# Patient Record
Sex: MALE
[De-identification: names, ages, dates, MRNs, and addresses within clinical notes are randomized per-mention and may not be internally consistent; named-entity substitution may affect disease eponyms.]

---

## 2020-10-27 PROBLEM — Z00.00 ENCOUNTER FOR PREVENTIVE HEALTH EXAMINATION: Status: ACTIVE | Noted: 2020-10-27

## 2020-10-28 ENCOUNTER — APPOINTMENT (OUTPATIENT)
Dept: UROLOGY | Facility: CLINIC | Age: 70
End: 2020-10-28
Payer: MEDICARE

## 2020-10-28 VITALS
TEMPERATURE: 97.2 F | SYSTOLIC BLOOD PRESSURE: 148 MMHG | HEART RATE: 82 BPM | HEIGHT: 70 IN | DIASTOLIC BLOOD PRESSURE: 83 MMHG | WEIGHT: 227 LBS | BODY MASS INDEX: 32.5 KG/M2

## 2020-10-28 DIAGNOSIS — Z86.59 PERSONAL HISTORY OF OTHER MENTAL AND BEHAVIORAL DISORDERS: ICD-10-CM

## 2020-10-28 DIAGNOSIS — F17.200 NICOTINE DEPENDENCE, UNSPECIFIED, UNCOMPLICATED: ICD-10-CM

## 2020-10-28 DIAGNOSIS — Z72.89 OTHER PROBLEMS RELATED TO LIFESTYLE: ICD-10-CM

## 2020-10-28 DIAGNOSIS — N40.0 BENIGN PROSTATIC HYPERPLASIA WITHOUT LOWER URINARY TRACT SYMPMS: ICD-10-CM

## 2020-10-28 DIAGNOSIS — Z87.09 PERSONAL HISTORY OF OTHER DISEASES OF THE RESPIRATORY SYSTEM: ICD-10-CM

## 2020-10-28 DIAGNOSIS — Z86.79 PERSONAL HISTORY OF OTHER DISEASES OF THE CIRCULATORY SYSTEM: ICD-10-CM

## 2020-10-28 DIAGNOSIS — R97.20 ELEVATED PROSTATE, SPECIFIC ANTIGEN [PSA]: ICD-10-CM

## 2020-10-28 PROCEDURE — 99072 ADDL SUPL MATRL&STAF TM PHE: CPT

## 2020-10-28 PROCEDURE — 99204 OFFICE O/P NEW MOD 45 MIN: CPT

## 2020-10-28 RX ORDER — SILDENAFIL CITRATE 100 MG/1
100 TABLET, FILM COATED ORAL
Refills: 0 | Status: ACTIVE | COMMUNITY

## 2020-10-28 RX ORDER — DOCUSATE SODIUM 100 MG/1
CAPSULE ORAL
Refills: 0 | Status: ACTIVE | COMMUNITY

## 2020-10-28 RX ORDER — AMLODIPINE BESYLATE 5 MG/1
5 TABLET ORAL
Refills: 0 | Status: ACTIVE | COMMUNITY

## 2020-10-28 RX ORDER — TAMSULOSIN HYDROCHLORIDE 0.4 MG/1
0.4 CAPSULE ORAL
Refills: 0 | Status: ACTIVE | COMMUNITY

## 2020-10-28 RX ORDER — CHLORHEXIDINE GLUCONATE 22ML
SPONGE MISCELLANEOUS
Refills: 0 | Status: ACTIVE | COMMUNITY

## 2020-10-28 RX ORDER — SACCHAROMYCES BOULARDII 50 MG
CAPSULE ORAL
Refills: 0 | Status: ACTIVE | COMMUNITY

## 2020-10-28 RX ORDER — PAROXETINE HYDROCHLORIDE 10 MG/1
10 TABLET, FILM COATED ORAL
Refills: 0 | Status: ACTIVE | COMMUNITY

## 2020-10-28 RX ORDER — TELMISARTAN 40 MG/1
40 TABLET ORAL
Refills: 0 | Status: ACTIVE | COMMUNITY

## 2020-10-28 RX ORDER — OLMESARTAN MEDOXOMIL 40 MG/1
40 TABLET, FILM COATED ORAL
Refills: 0 | Status: ACTIVE | COMMUNITY

## 2020-10-28 NOTE — HISTORY OF PRESENT ILLNESS
[FreeTextEntry1] : 70M hx of DM, BPH, Elevated PSA presents to establish care. He used to follow Dr. Daniel (urologist) who recently retired. \par His PSA 6/2020 was 9.1; on 10/2019 was 8.2 and in 2018 was 6.8. Pt reports he was told by Dr. Daniel no to be concerned about elevated PSA due to his age and hx of smoking. Currently smokes 1PPD\par Pt reports slow stream, nocturia 1-2x, straining in morning, frequency, denies dysuria or hematuria

## 2020-10-28 NOTE — REVIEW OF SYSTEMS
[Eyesight Problems] : eyesight problems [Constipation] : constipation [Diarrhea] : diarrhea [Poor quality erections] : Poor quality erections [Seen by urologist before (Name)  ___] : Preciously seen by a urologist: [unfilled] [History of kidney stones] : history of kidney stones [Wake up at night to urinate  How many times?  ___] : wakes up to urinate [unfilled] times during the night [Joint Pain] : joint pain [Joint Swelling] : joint swelling [Muscle Weakness] : muscle weakness [Negative] : Endocrine

## 2020-10-28 NOTE — PHYSICAL EXAM
[General Appearance - Well Developed] : well developed [General Appearance - Well Nourished] : well nourished [Normal Appearance] : normal appearance [Well Groomed] : well groomed [General Appearance - In No Acute Distress] : no acute distress [Edema] : no peripheral edema [] : no respiratory distress [Respiration, Rhythm And Depth] : normal respiratory rhythm and effort [Exaggerated Use Of Accessory Muscles For Inspiration] : no accessory muscle use [Abdomen Soft] : soft [Abdomen Tenderness] : non-tender [Urethral Meatus] : meatus normal [Urinary Bladder Findings] : the bladder was normal on palpation [Testes Tenderness] : no tenderness of the testes [Testes Mass (___cm)] : there were no testicular masses [Prostate Tenderness] : the prostate was not tender [No Prostate Nodules] : no prostate nodules [Prostate Size ___ gm] : prostate size [unfilled] gm [Normal Station and Gait] : the gait and station were normal for the patient's age [FreeTextEntry1] : left hydrocele

## 2020-10-28 NOTE — ASSESSMENT
[FreeTextEntry1] : 70M with elevated PSA 8.4\par will repeat PSA and % free PSA and call with results

## 2021-11-15 ENCOUNTER — APPOINTMENT (OUTPATIENT)
Dept: COLORECTAL SURGERY | Facility: CLINIC | Age: 71
End: 2021-11-15

## 2023-11-27 ENCOUNTER — APPOINTMENT (OUTPATIENT)
Dept: OPHTHALMOLOGY | Facility: CLINIC | Age: 73
End: 2023-11-27

## 2023-12-26 ENCOUNTER — OFFICE (OUTPATIENT)
Dept: URBAN - METROPOLITAN AREA CLINIC 103 | Facility: CLINIC | Age: 73
Setting detail: OPHTHALMOLOGY
End: 2023-12-26
Payer: MEDICARE

## 2023-12-26 DIAGNOSIS — H25.043: ICD-10-CM

## 2023-12-26 DIAGNOSIS — H25.013: ICD-10-CM

## 2023-12-26 DIAGNOSIS — E11.9: ICD-10-CM

## 2023-12-26 DIAGNOSIS — H43.811: ICD-10-CM

## 2023-12-26 DIAGNOSIS — H25.13: ICD-10-CM

## 2023-12-26 PROBLEM — H43.813 POSTERIOR VITREOUS DETACHMENT; BOTH EYES: Status: ACTIVE | Noted: 2023-12-26

## 2023-12-26 PROCEDURE — 92250 FUNDUS PHOTOGRAPHY W/I&R: CPT | Performed by: OPHTHALMOLOGY

## 2023-12-26 PROCEDURE — 92004 COMPRE OPH EXAM NEW PT 1/>: CPT | Performed by: OPHTHALMOLOGY

## 2023-12-26 ASSESSMENT — REFRACTION_CURRENTRX
OD_OVR_VA: 20/
OS_OVR_VA: 20/
OS_SPHERE: -2.25
OS_VPRISM_DIRECTION: PROGS
OS_ADD: +2.50
OD_AXIS: 035
OD_AXIS: 045
OD_VPRISM_DIRECTION: PROGS
OD_CYLINDER: -0.50
OS_OVR_VA: 20/
OS_SPHERE: -2.25
OS_CYLINDER: -0.50
OD_SPHERE: -1.50
OD_ADD: +2.50
OS_AXIS: 083
OD_OVR_VA: 20/
OS_CYLINDER: -0.50
OS_AXIS: 085
OD_SPHERE: -1.50
OD_CYLINDER: -0.50

## 2023-12-26 ASSESSMENT — REFRACTION_AUTOREFRACTION
OD_AXIS: 060
OS_AXIS: 166
OS_SPHERE: -3.75
OS_CYLINDER: -0.50
OD_SPHERE: -2.00
OD_CYLINDER: -1.00

## 2023-12-26 ASSESSMENT — CONFRONTATIONAL VISUAL FIELD TEST (CVF)
OD_FINDINGS: FULL
OS_FINDINGS: FULL

## 2023-12-26 ASSESSMENT — SPHEQUIV_DERIVED
OD_SPHEQUIV: -2.375
OS_SPHEQUIV: -4
OD_SPHEQUIV: -2.5

## 2023-12-26 ASSESSMENT — REFRACTION_MANIFEST
OD_SPHERE: -2.00
OS_CYLINDER: SPHERE
OS_SPHERE: -4.00
OD_CYLINDER: -0.75
OD_AXIS: 060

## 2024-01-30 ENCOUNTER — OFFICE (OUTPATIENT)
Dept: URBAN - METROPOLITAN AREA CLINIC 103 | Facility: CLINIC | Age: 74
Setting detail: OPHTHALMOLOGY
End: 2024-01-30
Payer: MEDICARE

## 2024-01-30 DIAGNOSIS — H25.043: ICD-10-CM

## 2024-01-30 DIAGNOSIS — H25.013: ICD-10-CM

## 2024-01-30 DIAGNOSIS — H25.11: ICD-10-CM

## 2024-01-30 DIAGNOSIS — E11.3293: ICD-10-CM

## 2024-01-30 DIAGNOSIS — H25.13: ICD-10-CM

## 2024-01-30 PROBLEM — H50.22 HYPERTROPIA; LEFT EYE: Status: ACTIVE | Noted: 2024-01-30

## 2024-01-30 PROBLEM — H25.12 CATARACT NUCLEAR SCLEROSIS AGE RELATED; RIGHT EYE, LEFT EYE, BOTH EYES: Status: ACTIVE | Noted: 2024-01-30

## 2024-01-30 PROCEDURE — 92136 OPHTHALMIC BIOMETRY: CPT | Mod: TC | Performed by: OPHTHALMOLOGY

## 2024-01-30 PROCEDURE — 92134 CPTRZ OPH DX IMG PST SGM RTA: CPT | Performed by: OPHTHALMOLOGY

## 2024-01-30 PROCEDURE — 92012 INTRM OPH EXAM EST PATIENT: CPT | Performed by: OPHTHALMOLOGY

## 2024-01-30 PROCEDURE — 92136 OPHTHALMIC BIOMETRY: CPT | Mod: RT | Performed by: OPHTHALMOLOGY

## 2024-01-30 ASSESSMENT — REFRACTION_CURRENTRX
OD_OVR_VA: 20/
OD_CYLINDER: -0.50
OD_ADD: +2.50
OS_CYLINDER: -0.50
OS_AXIS: 083
OS_ADD: +2.50
OS_CYLINDER: -0.50
OS_AXIS: 085
OD_OVR_VA: 20/
OD_VPRISM_DIRECTION: PROGS
OD_AXIS: 035
OD_CYLINDER: -0.50
OD_SPHERE: -1.50
OS_SPHERE: -2.25
OD_AXIS: 045
OD_SPHERE: -1.50
OS_OVR_VA: 20/
OS_SPHERE: -2.25
OS_VPRISM_DIRECTION: PROGS
OS_OVR_VA: 20/

## 2024-01-30 ASSESSMENT — REFRACTION_AUTOREFRACTION
OD_CYLINDER: -1.00
OD_AXIS: 62
OS_CYLINDER: -0.50
OS_SPHERE: -3.75
OS_AXIS: 166
OD_SPHERE: -2.25

## 2024-01-30 ASSESSMENT — CONFRONTATIONAL VISUAL FIELD TEST (CVF)
OS_FINDINGS: FULL
OD_FINDINGS: FULL

## 2024-01-30 ASSESSMENT — REFRACTION_MANIFEST
OD_AXIS: 060
OD_CYLINDER: -0.75
OS_SPHERE: -4.00
OD_SPHERE: -2.00
OS_CYLINDER: SPHERE

## 2024-01-30 ASSESSMENT — SPHEQUIV_DERIVED
OD_SPHEQUIV: -2.75
OD_SPHEQUIV: -2.375
OS_SPHEQUIV: -4

## 2024-02-26 ENCOUNTER — AMBULATORY SURGERY CENTER (OUTPATIENT)
Dept: URBAN - METROPOLITAN AREA SURGERY 4 | Facility: SURGERY | Age: 74
Setting detail: OPHTHALMOLOGY
End: 2024-02-26
Payer: MEDICARE

## 2024-02-26 ENCOUNTER — RX ONLY (RX ONLY)
Age: 74
End: 2024-02-26

## 2024-02-26 DIAGNOSIS — H25.11: ICD-10-CM

## 2024-02-26 DIAGNOSIS — H52.211: ICD-10-CM

## 2024-02-26 PROCEDURE — 66984 XCAPSL CTRC RMVL W/O ECP: CPT | Mod: RT | Performed by: OPHTHALMOLOGY

## 2024-02-26 PROCEDURE — FEMTO FEMTOSECOND LASER: Mod: GY | Performed by: OPHTHALMOLOGY

## 2024-02-26 ASSESSMENT — CORNEAL EDEMA CLINICAL DESCRIPTION: OD_CORNEALEDEMA: 1+

## 2024-02-27 ENCOUNTER — OFFICE (OUTPATIENT)
Dept: URBAN - METROPOLITAN AREA CLINIC 103 | Facility: CLINIC | Age: 74
Setting detail: OPHTHALMOLOGY
End: 2024-02-27
Payer: MEDICARE

## 2024-02-27 DIAGNOSIS — H25.12: ICD-10-CM

## 2024-02-27 PROBLEM — H25.042 POSTERIOR SUBCAPSULAR POLAR CATARACT; LEFT EYE: Status: ACTIVE | Noted: 2024-02-27

## 2024-02-27 PROBLEM — H25.012 CORTICAL CATARACT; LEFT EYE: Status: ACTIVE | Noted: 2024-02-27

## 2024-02-27 PROBLEM — Z96.1 PSEUDOPHAKIA: Status: ACTIVE | Noted: 2024-02-27

## 2024-02-27 PROCEDURE — 92136 OPHTHALMIC BIOMETRY: CPT | Mod: LT | Performed by: OPHTHALMOLOGY

## 2024-02-27 ASSESSMENT — REFRACTION_AUTOREFRACTION
OD_AXIS: 077
OS_CYLINDER: -1.00
OS_SPHERE: -3.75
OD_SPHERE: +1.00
OS_AXIS: 154
OD_CYLINDER: -1.50

## 2024-02-27 ASSESSMENT — REFRACTION_CURRENTRX
OD_AXIS: 035
OD_VPRISM_DIRECTION: PROGS
OS_CYLINDER: -0.50
OS_CYLINDER: -0.50
OS_SPHERE: -2.25
OD_SPHERE: -1.50
OD_CYLINDER: -0.50
OS_OVR_VA: 20/
OS_ADD: +2.50
OS_OVR_VA: 20/
OD_AXIS: 045
OD_OVR_VA: 20/
OD_CYLINDER: -0.50
OS_SPHERE: -2.25
OS_VPRISM_DIRECTION: PROGS
OD_ADD: +2.50
OD_SPHERE: -1.50
OS_AXIS: 085
OS_AXIS: 083
OD_OVR_VA: 20/

## 2024-02-27 ASSESSMENT — REFRACTION_MANIFEST
OS_SPHERE: -4.00
OD_AXIS: 060
OD_CYLINDER: -0.75
OS_CYLINDER: SPHERE
OD_SPHERE: -2.00

## 2024-02-27 ASSESSMENT — SPHEQUIV_DERIVED
OS_SPHEQUIV: -4.25
OD_SPHEQUIV: -2.375
OD_SPHEQUIV: 0.25

## 2024-03-11 ENCOUNTER — RX ONLY (RX ONLY)
Age: 74
End: 2024-03-11

## 2024-03-11 ENCOUNTER — AMBULATORY SURGERY CENTER (OUTPATIENT)
Dept: URBAN - METROPOLITAN AREA SURGERY 4 | Facility: SURGERY | Age: 74
Setting detail: OPHTHALMOLOGY
End: 2024-03-11
Payer: MEDICARE

## 2024-03-11 DIAGNOSIS — H25.12: ICD-10-CM

## 2024-03-11 DIAGNOSIS — H52.212: ICD-10-CM

## 2024-03-11 PROCEDURE — 66984 XCAPSL CTRC RMVL W/O ECP: CPT | Mod: 79,LT | Performed by: OPHTHALMOLOGY

## 2024-03-11 PROCEDURE — FEMTO FEMTOSECOND LASER: Mod: GY | Performed by: OPHTHALMOLOGY

## 2024-03-12 ENCOUNTER — OFFICE (OUTPATIENT)
Dept: URBAN - METROPOLITAN AREA CLINIC 103 | Facility: CLINIC | Age: 74
Setting detail: OPHTHALMOLOGY
End: 2024-03-12
Payer: MEDICARE

## 2024-03-12 DIAGNOSIS — Z96.1: ICD-10-CM

## 2024-03-12 PROCEDURE — 99024 POSTOP FOLLOW-UP VISIT: CPT | Performed by: OPTOMETRIST

## 2024-03-12 ASSESSMENT — REFRACTION_CURRENTRX
OD_OVR_VA: 20/
OD_AXIS: 035
OD_VPRISM_DIRECTION: PROGS
OS_AXIS: 085
OS_OVR_VA: 20/
OS_AXIS: 083
OD_AXIS: 045
OD_OVR_VA: 20/
OS_CYLINDER: -0.50
OS_CYLINDER: -0.50
OD_ADD: +2.50
OD_CYLINDER: -0.50
OD_SPHERE: -1.50
OS_SPHERE: -2.25
OS_OVR_VA: 20/
OD_CYLINDER: -0.50
OS_ADD: +2.50
OD_SPHERE: -1.50
OS_SPHERE: -2.25
OS_VPRISM_DIRECTION: PROGS

## 2024-03-12 ASSESSMENT — REFRACTION_MANIFEST
OD_SPHERE: -2.00
OS_SPHERE: -4.00
OD_CYLINDER: -0.75
OS_CYLINDER: SPHERE
OD_AXIS: 060

## 2024-03-12 ASSESSMENT — SPHEQUIV_DERIVED: OD_SPHEQUIV: -2.375

## 2024-03-26 ENCOUNTER — OFFICE (OUTPATIENT)
Dept: URBAN - METROPOLITAN AREA CLINIC 103 | Facility: CLINIC | Age: 74
Setting detail: OPHTHALMOLOGY
End: 2024-03-26
Payer: MEDICARE

## 2024-03-26 DIAGNOSIS — Z96.1: ICD-10-CM

## 2024-03-26 PROCEDURE — 99024 POSTOP FOLLOW-UP VISIT: CPT | Performed by: OPTOMETRIST

## 2024-03-26 ASSESSMENT — REFRACTION_MANIFEST
OD_CYLINDER: -1.00
OD_SPHERE: -2.00
OS_CYLINDER: -0.50
OD_CYLINDER: -0.75
OS_VA1: 20/20
OS_AXIS: 071
OS_CYLINDER: SPHERE
OS_SPHERE: -2.00
OS_SPHERE: -4.00
OD_SPHERE: +0.50
OD_AXIS: 060
OD_AXIS: 046
OD_VA1: 20/20

## 2024-03-26 ASSESSMENT — REFRACTION_CURRENTRX
OD_VPRISM_DIRECTION: PROGS
OS_VPRISM_DIRECTION: PROGS
OS_CYLINDER: -0.50
OS_ADD: +2.50
OD_AXIS: 035
OD_SPHERE: -1.50
OD_CYLINDER: -0.50
OS_AXIS: 083
OD_AXIS: 045
OS_OVR_VA: 20/
OS_SPHERE: -2.25
OS_OVR_VA: 20/
OS_SPHERE: -2.25
OD_SPHERE: -1.50
OD_CYLINDER: -0.50
OS_CYLINDER: -0.50
OD_ADD: +2.50
OS_AXIS: 085
OD_OVR_VA: 20/
OD_OVR_VA: 20/

## 2024-03-26 ASSESSMENT — SPHEQUIV_DERIVED
OD_SPHEQUIV: -2.375
OD_SPHEQUIV: 0
OS_SPHEQUIV: -2.25

## 2024-04-09 ENCOUNTER — OFFICE (OUTPATIENT)
Dept: URBAN - METROPOLITAN AREA CLINIC 103 | Facility: CLINIC | Age: 74
Setting detail: OPHTHALMOLOGY
End: 2024-04-09
Payer: MEDICARE

## 2024-04-09 DIAGNOSIS — H52.13: ICD-10-CM

## 2024-04-09 DIAGNOSIS — Z96.1: ICD-10-CM

## 2024-04-09 DIAGNOSIS — H50.22: ICD-10-CM

## 2024-04-09 PROCEDURE — 99024 POSTOP FOLLOW-UP VISIT: CPT | Performed by: OPTOMETRIST

## 2024-04-09 PROCEDURE — 92015 DETERMINE REFRACTIVE STATE: CPT | Performed by: OPTOMETRIST
